# Patient Record
Sex: FEMALE | Race: BLACK OR AFRICAN AMERICAN | NOT HISPANIC OR LATINO | Employment: OTHER | URBAN - METROPOLITAN AREA
[De-identification: names, ages, dates, MRNs, and addresses within clinical notes are randomized per-mention and may not be internally consistent; named-entity substitution may affect disease eponyms.]

---

## 2021-05-12 ENCOUNTER — PREPPED CHART (OUTPATIENT)
Dept: URBAN - METROPOLITAN AREA CLINIC 19 | Facility: CLINIC | Age: 76
End: 2021-05-12

## 2022-05-12 ENCOUNTER — ESTABLISHED PATIENT (OUTPATIENT)
Dept: URBAN - METROPOLITAN AREA CLINIC 19 | Facility: CLINIC | Age: 77
End: 2022-05-12

## 2022-05-12 DIAGNOSIS — H25.813: ICD-10-CM

## 2022-05-12 DIAGNOSIS — H04.123: ICD-10-CM

## 2022-05-12 DIAGNOSIS — H52.4: ICD-10-CM

## 2022-05-12 DIAGNOSIS — H43.393: ICD-10-CM

## 2022-05-12 DIAGNOSIS — H35.363: ICD-10-CM

## 2022-05-12 PROCEDURE — 92014 COMPRE OPH EXAM EST PT 1/>: CPT

## 2022-05-12 ASSESSMENT — VISUAL ACUITY
OS_SC: 20/40
OD_PH: 20/25
OU_SC: 20/40+2
OD_SC: 20/40
OS_PH: 20/25

## 2022-05-12 ASSESSMENT — TONOMETRY
OS_IOP_MMHG: 10
OD_IOP_MMHG: 16

## 2022-05-12 ASSESSMENT — KERATOMETRY
OS_AXISANGLE_DEGREES: 116
OD_AXISANGLE2_DEGREES: 136
OS_K2POWER_DIOPTERS: 42.75
OD_K2POWER_DIOPTERS: 42.75
OD_K1POWER_DIOPTERS: 42.25
OD_AXISANGLE_DEGREES: 046
OS_K1POWER_DIOPTERS: 42.00
OS_AXISANGLE2_DEGREES: 26

## 2022-05-12 NOTE — PATIENT DISCUSSION
I discussed that the patient may need cataract surgery in future and should return to clinic sooner if the vision worsens.

## 2023-05-16 ENCOUNTER — ESTABLISHED PATIENT (OUTPATIENT)
Dept: URBAN - METROPOLITAN AREA CLINIC 19 | Facility: CLINIC | Age: 78
End: 2023-05-16

## 2023-05-16 DIAGNOSIS — H25.813: ICD-10-CM

## 2023-05-16 DIAGNOSIS — H43.393: ICD-10-CM

## 2023-05-16 DIAGNOSIS — H35.363: ICD-10-CM

## 2023-05-16 DIAGNOSIS — H52.4: ICD-10-CM

## 2023-05-16 DIAGNOSIS — H04.123: ICD-10-CM

## 2023-05-16 PROCEDURE — 92015 DETERMINE REFRACTIVE STATE: CPT

## 2023-05-16 PROCEDURE — 92014 COMPRE OPH EXAM EST PT 1/>: CPT

## 2023-05-16 RX ORDER — POLYETHYLENE GLYCOL 400 2.5 MG/ML: 1 SOLUTION/ DROPS OPHTHALMIC TWICE A DAY

## 2023-05-16 ASSESSMENT — KERATOMETRY
OD_K1POWER_DIOPTERS: 42.25
OD_AXISANGLE2_DEGREES: 136
OS_AXISANGLE_DEGREES: 116
OS_K1POWER_DIOPTERS: 42.00
OS_K2POWER_DIOPTERS: 42.75
OD_K2POWER_DIOPTERS: 42.75
OD_AXISANGLE_DEGREES: 046
OS_AXISANGLE2_DEGREES: 26

## 2023-05-16 ASSESSMENT — TONOMETRY
OD_IOP_MMHG: 13
OS_IOP_MMHG: 12

## 2023-05-16 ASSESSMENT — VISUAL ACUITY
OD_SC: 20/60+2
OU_SC: J7
OS_PH: 20/30
OD_PH: 20/30-1
OS_SC: 20/50+1

## 2024-05-16 ENCOUNTER — ESTABLISHED PATIENT (OUTPATIENT)
Dept: URBAN - METROPOLITAN AREA CLINIC 19 | Facility: CLINIC | Age: 79
End: 2024-05-16

## 2024-05-16 DIAGNOSIS — H02.886: ICD-10-CM

## 2024-05-16 DIAGNOSIS — H02.883: ICD-10-CM

## 2024-05-16 DIAGNOSIS — H43.393: ICD-10-CM

## 2024-05-16 DIAGNOSIS — H35.363: ICD-10-CM

## 2024-05-16 DIAGNOSIS — H04.123: ICD-10-CM

## 2024-05-16 PROCEDURE — 92250 FUNDUS PHOTOGRAPHY W/I&R: CPT

## 2024-05-16 PROCEDURE — 92014 COMPRE OPH EXAM EST PT 1/>: CPT

## 2024-05-16 ASSESSMENT — VISUAL ACUITY
OS_SC: 20/25+2
OU_SC: 20/25-1
OD_SC: 20/25-1

## 2024-05-16 ASSESSMENT — KERATOMETRY
OD_K1POWER_DIOPTERS: 42.50
OD_K2POWER_DIOPTERS: 43.75
OS_K1POWER_DIOPTERS: 43.50
OS_K2POWER_DIOPTERS: 42.50
OS_AXISANGLE2_DEGREES: 126
OS_AXISANGLE_DEGREES: 36
OD_AXISANGLE_DEGREES: 13
OD_AXISANGLE2_DEGREES: 103

## 2024-05-16 ASSESSMENT — TONOMETRY
OS_IOP_MMHG: 10
OD_IOP_MMHG: 10

## 2025-05-20 ENCOUNTER — COMPREHENSIVE EXAM (OUTPATIENT)
Age: 80
End: 2025-05-20

## 2025-05-20 DIAGNOSIS — H43.393: ICD-10-CM

## 2025-05-20 DIAGNOSIS — H02.883: ICD-10-CM

## 2025-05-20 DIAGNOSIS — H04.123: ICD-10-CM

## 2025-05-20 DIAGNOSIS — H02.886: ICD-10-CM

## 2025-05-20 DIAGNOSIS — H35.363: ICD-10-CM

## 2025-05-20 PROCEDURE — 92134 CPTRZ OPH DX IMG PST SGM RTA: CPT

## 2025-05-20 PROCEDURE — 92014 COMPRE OPH EXAM EST PT 1/>: CPT
